# Patient Record
Sex: FEMALE | Race: WHITE | NOT HISPANIC OR LATINO | Employment: OTHER | URBAN - METROPOLITAN AREA
[De-identification: names, ages, dates, MRNs, and addresses within clinical notes are randomized per-mention and may not be internally consistent; named-entity substitution may affect disease eponyms.]

---

## 2017-05-31 ENCOUNTER — IMPORTED ENCOUNTER (OUTPATIENT)
Dept: URBAN - METROPOLITAN AREA CLINIC 31 | Facility: CLINIC | Age: 67
End: 2017-05-31

## 2017-05-31 PROBLEM — Z96.1: Noted: 2017-05-31

## 2017-05-31 PROBLEM — H40.003: Noted: 2017-05-31

## 2017-05-31 PROCEDURE — 99214 OFFICE O/P EST MOD 30 MIN: CPT

## 2017-11-17 ENCOUNTER — IMPORTED ENCOUNTER (OUTPATIENT)
Dept: URBAN - METROPOLITAN AREA CLINIC 31 | Facility: CLINIC | Age: 67
End: 2017-11-17

## 2017-11-17 PROBLEM — H57.051: Noted: 2017-11-17

## 2017-11-17 PROBLEM — Z96.1: Noted: 2017-11-17

## 2017-11-17 PROBLEM — H40.003: Noted: 2017-11-17

## 2017-11-17 PROCEDURE — 92083 EXTENDED VISUAL FIELD XM: CPT

## 2017-11-17 PROCEDURE — 99213 OFFICE O/P EST LOW 20 MIN: CPT

## 2018-05-04 ENCOUNTER — IMPORTED ENCOUNTER (OUTPATIENT)
Dept: URBAN - METROPOLITAN AREA CLINIC 31 | Facility: CLINIC | Age: 68
End: 2018-05-04

## 2018-05-04 PROBLEM — Z96.1: Noted: 2018-05-04

## 2018-05-04 PROBLEM — H40.013: Noted: 2018-05-04

## 2018-05-04 PROCEDURE — 92014 COMPRE OPH EXAM EST PT 1/>: CPT

## 2018-05-04 PROCEDURE — 92133 CPTRZD OPH DX IMG PST SGM ON: CPT

## 2018-05-04 PROCEDURE — 92015 DETERMINE REFRACTIVE STATE: CPT

## 2018-10-24 ENCOUNTER — IMPORTED ENCOUNTER (OUTPATIENT)
Dept: URBAN - METROPOLITAN AREA CLINIC 31 | Facility: CLINIC | Age: 68
End: 2018-10-24

## 2018-10-24 PROBLEM — H57.051: Noted: 2018-10-24

## 2018-10-24 PROBLEM — H40.013: Noted: 2018-10-24

## 2018-10-24 PROBLEM — Z96.1: Noted: 2018-10-24

## 2018-10-24 PROCEDURE — 99214 OFFICE O/P EST MOD 30 MIN: CPT

## 2018-10-24 PROCEDURE — 92083 EXTENDED VISUAL FIELD XM: CPT

## 2019-05-01 ENCOUNTER — IMPORTED ENCOUNTER (OUTPATIENT)
Dept: URBAN - METROPOLITAN AREA CLINIC 31 | Facility: CLINIC | Age: 69
End: 2019-05-01

## 2019-05-01 PROBLEM — Z96.1: Noted: 2019-05-01

## 2019-05-01 PROBLEM — H57.051: Noted: 2019-05-01

## 2019-05-01 PROBLEM — H40.013: Noted: 2019-05-01

## 2019-05-01 PROCEDURE — 92014 COMPRE OPH EXAM EST PT 1/>: CPT

## 2019-05-01 PROCEDURE — 92133 CPTRZD OPH DX IMG PST SGM ON: CPT

## 2019-11-08 ENCOUNTER — IMPORTED ENCOUNTER (OUTPATIENT)
Dept: URBAN - METROPOLITAN AREA CLINIC 31 | Facility: CLINIC | Age: 69
End: 2019-11-08

## 2019-11-08 PROBLEM — H40.013: Noted: 2019-11-08

## 2019-11-08 PROBLEM — Z96.1: Noted: 2019-11-08

## 2019-11-08 PROBLEM — H57.051: Noted: 2019-11-08

## 2019-11-08 PROCEDURE — 99213 OFFICE O/P EST LOW 20 MIN: CPT

## 2019-11-08 PROCEDURE — 92083 EXTENDED VISUAL FIELD XM: CPT

## 2020-05-01 ENCOUNTER — IMPORTED ENCOUNTER (OUTPATIENT)
Dept: URBAN - METROPOLITAN AREA CLINIC 31 | Facility: CLINIC | Age: 70
End: 2020-05-01

## 2020-05-01 PROBLEM — H40.013: Noted: 2020-05-01

## 2020-05-01 PROBLEM — H57.051: Noted: 2020-05-01

## 2020-05-01 PROBLEM — H57.053: Noted: 2020-05-01

## 2020-05-01 PROBLEM — Z96.1: Noted: 2020-05-01

## 2020-05-01 PROCEDURE — 99213 OFFICE O/P EST LOW 20 MIN: CPT

## 2020-05-01 PROCEDURE — 92133 CPTRZD OPH DX IMG PST SGM ON: CPT

## 2020-11-25 ENCOUNTER — TELEPHONE ENCOUNTER (OUTPATIENT)
Dept: URBAN - METROPOLITAN AREA CLINIC 9 | Facility: CLINIC | Age: 70
End: 2020-11-25

## 2020-11-30 ENCOUNTER — TELEPHONE ENCOUNTER (OUTPATIENT)
Dept: URBAN - METROPOLITAN AREA CLINIC 9 | Facility: CLINIC | Age: 70
End: 2020-11-30

## 2021-05-03 ENCOUNTER — IMPORTED ENCOUNTER (OUTPATIENT)
Dept: URBAN - METROPOLITAN AREA CLINIC 31 | Facility: CLINIC | Age: 71
End: 2021-05-03

## 2021-05-03 PROBLEM — H57.053: Noted: 2021-05-03

## 2021-05-03 PROBLEM — Z96.1: Noted: 2021-05-03

## 2021-05-03 PROBLEM — H40.013: Noted: 2021-05-03

## 2021-05-03 PROCEDURE — 92014 COMPRE OPH EXAM EST PT 1/>: CPT

## 2021-05-03 PROCEDURE — 92083 EXTENDED VISUAL FIELD XM: CPT

## 2021-11-10 ENCOUNTER — IMPORTED ENCOUNTER (OUTPATIENT)
Dept: URBAN - METROPOLITAN AREA CLINIC 31 | Facility: CLINIC | Age: 71
End: 2021-11-10

## 2021-11-10 PROBLEM — H26.492: Noted: 2021-11-10

## 2021-11-10 PROBLEM — D31.32: Noted: 2021-11-10

## 2021-11-10 PROBLEM — Z96.1: Noted: 2021-11-10

## 2021-11-10 PROBLEM — H40.013: Noted: 2021-11-10

## 2021-11-10 PROCEDURE — 99214 OFFICE O/P EST MOD 30 MIN: CPT

## 2021-11-12 ENCOUNTER — NEW REFERRAL (OUTPATIENT)
Dept: URBAN - METROPOLITAN AREA CLINIC 26 | Facility: CLINIC | Age: 71
End: 2021-11-12

## 2021-11-12 VITALS
BODY MASS INDEX: 21.07 KG/M2 | DIASTOLIC BLOOD PRESSURE: 78 MMHG | WEIGHT: 128 LBS | HEIGHT: 65.5 IN | SYSTOLIC BLOOD PRESSURE: 115 MMHG | HEART RATE: 72 BPM

## 2021-11-12 DIAGNOSIS — H04.123: ICD-10-CM

## 2021-11-12 DIAGNOSIS — D31.32: ICD-10-CM

## 2021-11-12 DIAGNOSIS — H35.363: ICD-10-CM

## 2021-11-12 DIAGNOSIS — H43.813: ICD-10-CM

## 2021-11-12 PROCEDURE — 92004 COMPRE OPH EXAM NEW PT 1/>: CPT

## 2021-11-12 PROCEDURE — 92250 FUNDUS PHOTOGRAPHY W/I&R: CPT

## 2021-11-12 ASSESSMENT — TONOMETRY
OD_IOP_MMHG: 15
OS_IOP_MMHG: 17

## 2021-11-12 ASSESSMENT — VISUAL ACUITY
OD_SC: 20/20-1
OS_SC: 20/20

## 2021-12-10 ENCOUNTER — IMPORTED ENCOUNTER (OUTPATIENT)
Dept: URBAN - METROPOLITAN AREA CLINIC 31 | Facility: CLINIC | Age: 71
End: 2021-12-10

## 2021-12-10 PROBLEM — H40.013: Noted: 2021-12-10

## 2021-12-10 PROBLEM — D31.32: Noted: 2021-12-10

## 2021-12-10 PROBLEM — H26.492: Noted: 2021-12-10

## 2021-12-10 PROBLEM — Z96.1: Noted: 2021-12-10

## 2021-12-10 PROCEDURE — 99213 OFFICE O/P EST LOW 20 MIN: CPT

## 2022-02-11 ENCOUNTER — FOLLOW UP (OUTPATIENT)
Dept: URBAN - METROPOLITAN AREA CLINIC 26 | Facility: CLINIC | Age: 72
End: 2022-02-11

## 2022-02-11 VITALS — WEIGHT: 128 LBS | HEIGHT: 65 IN | BODY MASS INDEX: 21.33 KG/M2

## 2022-02-11 DIAGNOSIS — H35.363: ICD-10-CM

## 2022-02-11 DIAGNOSIS — H04.123: ICD-10-CM

## 2022-02-11 DIAGNOSIS — D31.32: ICD-10-CM

## 2022-02-11 DIAGNOSIS — H43.813: ICD-10-CM

## 2022-02-11 DIAGNOSIS — H40.013: ICD-10-CM

## 2022-02-11 PROCEDURE — 92014 COMPRE OPH EXAM EST PT 1/>: CPT

## 2022-02-11 PROCEDURE — 92250 FUNDUS PHOTOGRAPHY W/I&R: CPT

## 2022-02-11 PROCEDURE — 76512 OPH US DX B-SCAN: CPT

## 2022-02-11 ASSESSMENT — TONOMETRY
OD_IOP_MMHG: 13
OS_IOP_MMHG: 14

## 2022-02-11 ASSESSMENT — VISUAL ACUITY
OS_SC: 20/20
OD_SC: 20/30-2

## 2022-03-16 ENCOUNTER — OFFICE VISIT (OUTPATIENT)
Age: 72
End: 2022-03-16

## 2022-03-18 ENCOUNTER — TELEPHONE ENCOUNTER (OUTPATIENT)
Dept: URBAN - METROPOLITAN AREA CLINIC 9 | Facility: CLINIC | Age: 72
End: 2022-03-18

## 2022-04-02 ASSESSMENT — VISUAL ACUITY
OS_SC: J1+16''
OS_SC: 20/20-1
OS_SC: J1
OS_SC: 20/20
OS_CC: 20/400
OS_SC: J1+13''
OD_CC: 20/20
OS_SC: J1+14''
OD_SC: 20/20
OD_CC: 20/20
OD_CC: 20/20-2
OS_CC: 20/200
OS_SC: J1+16''
OD_SC: J16
OD_CC: 20/20-1
OD_CC: 20/20-1
OU_SC: J1+16''
OD_CC: 20/25
OD_CC: 20/20
OU_CC: 20/2016''
OS_SC: J1+16''
OD_CC: 20/20
OS_SC: J1+14''
OS_SC: J1+
OD_CC: 20/20-1
OD_CC: 20/25
OS_SC: J1+

## 2022-04-02 ASSESSMENT — TONOMETRY
OS_IOP_MMHG: 16
OS_IOP_MMHG: 17
OD_IOP_MMHG: 16
OD_IOP_MMHG: 15
OD_IOP_MMHG: 16
OS_IOP_MMHG: 13
OD_IOP_MMHG: 14
OS_IOP_MMHG: 18
OD_IOP_MMHG: 20
OS_IOP_MMHG: 14
OS_IOP_MMHG: 15
OS_IOP_MMHG: 16
OS_IOP_MMHG: 16
OD_IOP_MMHG: 14
OD_IOP_MMHG: 14
OS_IOP_MMHG: 17
OS_IOP_MMHG: 16
OD_IOP_MMHG: 17

## 2022-04-08 ENCOUNTER — FOLLOW UP (OUTPATIENT)
Dept: URBAN - METROPOLITAN AREA CLINIC 29 | Facility: CLINIC | Age: 72
End: 2022-04-08

## 2022-04-08 DIAGNOSIS — H02.831: ICD-10-CM

## 2022-04-08 DIAGNOSIS — H35.363: ICD-10-CM

## 2022-04-08 DIAGNOSIS — H43.813: ICD-10-CM

## 2022-04-08 DIAGNOSIS — H40.013: ICD-10-CM

## 2022-04-08 DIAGNOSIS — H04.123: ICD-10-CM

## 2022-04-08 DIAGNOSIS — Z96.1: ICD-10-CM

## 2022-04-08 DIAGNOSIS — D31.32: ICD-10-CM

## 2022-04-08 PROCEDURE — 92014 COMPRE OPH EXAM EST PT 1/>: CPT

## 2022-04-08 ASSESSMENT — VISUAL ACUITY
OS_SC: 20/200
OD_SC: 20/20

## 2022-04-08 ASSESSMENT — TONOMETRY
OS_IOP_MMHG: 19
OD_IOP_MMHG: 17

## 2022-05-04 ENCOUNTER — TELEPHONE ENCOUNTER (OUTPATIENT)
Dept: URBAN - METROPOLITAN AREA CLINIC 9 | Facility: CLINIC | Age: 72
End: 2022-05-04

## 2022-05-04 ENCOUNTER — OFFICE VISIT (OUTPATIENT)
Dept: URBAN - METROPOLITAN AREA SURGERY CENTER 5 | Facility: SURGERY CENTER | Age: 72
End: 2022-05-04

## 2022-07-30 ENCOUNTER — TELEPHONE ENCOUNTER (OUTPATIENT)
Age: 72
End: 2022-07-30

## 2022-07-30 RX ORDER — PANTOPRAZOLE 20 MG/1
1 (ONE) TABLET DR TABLET, DELAYED RELEASE ORAL DAILY
Qty: 0 | Refills: 3 | OUTPATIENT
Start: 2016-08-24 | End: 2017-01-25

## 2022-07-30 RX ORDER — AMOXICILLIN 500 MG/1
2 (TWO) CAPSULE ORAL
Qty: 0 | Refills: 2 | OUTPATIENT
Start: 2015-12-31 | End: 2016-01-10

## 2022-07-30 RX ORDER — LACTOSE-REDUCED FOOD
236 LIQUID (ML) ORAL
Qty: 0 | Refills: 5 | OUTPATIENT
Start: 2019-11-05 | End: 2022-03-16

## 2022-07-30 RX ORDER — FAMOTIDINE 40 MG/1
1 (ONE) TABLET, FILM COATED ORAL DAILY
Qty: 0 | Refills: 2 | OUTPATIENT
Start: 2019-09-23 | End: 2022-03-16

## 2022-07-31 ENCOUNTER — TELEPHONE ENCOUNTER (OUTPATIENT)
Age: 72
End: 2022-07-31

## 2022-07-31 RX ORDER — LACTOSE-REDUCED FOOD
236 LIQUID (ML) ORAL
Qty: 0 | Refills: 5 | Status: ACTIVE | COMMUNITY
Start: 2019-11-05

## 2022-07-31 RX ORDER — PANTOPRAZOLE 20 MG/1
1 (ONE) TABLET, DELAYED RELEASE ORAL DAILY
Qty: 0 | Refills: 3 | Status: ACTIVE | COMMUNITY
Start: 2016-08-24

## 2022-07-31 RX ORDER — FAMOTIDINE 40 MG/1
1 (ONE) TABLET, FILM COATED ORAL DAILY
Qty: 0 | Refills: 3 | Status: ACTIVE | COMMUNITY
Start: 2018-04-03

## 2022-07-31 RX ORDER — PANTOPRAZOLE 20 MG/1
1 (ONE) TABLET, DELAYED RELEASE ORAL DAILY
Qty: 0 | Refills: 2 | Status: ACTIVE | COMMUNITY
Start: 2016-08-23

## 2022-07-31 RX ORDER — FAMOTIDINE 40 MG/1
1 (ONE) TABLET, FILM COATED ORAL DAILY
Qty: 0 | Refills: 12 | Status: ACTIVE | COMMUNITY
Start: 2018-05-11

## 2022-07-31 RX ORDER — FAMOTIDINE 40 MG/1
1 (ONE) TABLET, FILM COATED ORAL DAILY
Qty: 0 | Refills: 2 | Status: ACTIVE | COMMUNITY
Start: 2019-06-17

## 2022-07-31 RX ORDER — PANTOPRAZOLE 20 MG/1
1 (ONE) TABLET, DELAYED RELEASE ORAL DAILY
Qty: 0 | Refills: 16 | Status: ACTIVE | COMMUNITY
Start: 2015-12-08

## 2022-07-31 RX ORDER — AMOXICILLIN 500 MG/1
2 (TWO) CAPSULE ORAL
Qty: 0 | Refills: 2 | Status: ACTIVE | COMMUNITY
Start: 2015-12-31

## 2022-07-31 RX ORDER — FAMOTIDINE 40 MG/1
1 (ONE) TABLET, FILM COATED ORAL DAILY
Qty: 0 | Refills: 3 | Status: ACTIVE | COMMUNITY
Start: 2018-05-01

## 2022-07-31 RX ORDER — FAMOTIDINE 40 MG/1
1 (ONE) TABLET, FILM COATED ORAL DAILY
Qty: 0 | Refills: 2 | Status: ACTIVE | COMMUNITY
Start: 2018-05-14

## 2022-07-31 RX ORDER — FAMOTIDINE 40 MG/1
1 (ONE) TABLET, FILM COATED ORAL DAILY
Qty: 0 | Refills: 2 | Status: ACTIVE | COMMUNITY
Start: 2019-09-23

## 2022-07-31 RX ORDER — FAMOTIDINE 40 MG/1
1 (ONE) TABLET, FILM COATED ORAL DAILY
Qty: 0 | Refills: 5 | Status: ACTIVE | COMMUNITY
Start: 2017-12-29

## 2022-07-31 RX ORDER — FAMOTIDINE 40 MG/1
1 (ONE) TABLET, FILM COATED ORAL DAILY
Qty: 0 | Refills: 3 | Status: ACTIVE | COMMUNITY
Start: 2018-04-04

## 2022-07-31 RX ORDER — FAMOTIDINE 40 MG/1
1 (ONE) TABLET, FILM COATED ORAL DAILY
Qty: 0 | Refills: 16 | Status: ACTIVE | COMMUNITY
Start: 2017-01-25

## 2022-07-31 RX ORDER — PANTOPRAZOLE 20 MG/1
1 (ONE) TABLET, DELAYED RELEASE ORAL DAILY
Qty: 0 | Refills: 2 | Status: ACTIVE | COMMUNITY
Start: 2016-08-22

## 2022-10-27 ENCOUNTER — FOLLOW UP (OUTPATIENT)
Dept: URBAN - METROPOLITAN AREA CLINIC 26 | Facility: CLINIC | Age: 72
End: 2022-10-27

## 2022-10-27 DIAGNOSIS — H40.013: ICD-10-CM

## 2022-10-27 DIAGNOSIS — D31.32: ICD-10-CM

## 2022-10-27 DIAGNOSIS — H35.363: ICD-10-CM

## 2022-10-27 DIAGNOSIS — H04.123: ICD-10-CM

## 2022-10-27 DIAGNOSIS — H43.813: ICD-10-CM

## 2022-10-27 PROCEDURE — 92014 COMPRE OPH EXAM EST PT 1/>: CPT

## 2022-10-27 PROCEDURE — 92250 FUNDUS PHOTOGRAPHY W/I&R: CPT

## 2022-10-27 PROCEDURE — 76512 OPH US DX B-SCAN: CPT

## 2022-10-27 ASSESSMENT — VISUAL ACUITY
OS_SC: 20/20
OD_SC: 20/25-2

## 2022-10-27 ASSESSMENT — TONOMETRY
OD_IOP_MMHG: 21
OD_IOP_MMHG: 14
OS_IOP_MMHG: 18

## 2022-11-02 ENCOUNTER — PREPPED CHART (OUTPATIENT)
Dept: URBAN - METROPOLITAN AREA CLINIC 29 | Facility: CLINIC | Age: 72
End: 2022-11-02

## 2022-11-04 ENCOUNTER — ESTABLISHED PATIENT (OUTPATIENT)
Dept: URBAN - METROPOLITAN AREA CLINIC 29 | Facility: CLINIC | Age: 72
End: 2022-11-04

## 2022-11-04 DIAGNOSIS — H43.813: ICD-10-CM

## 2022-11-04 DIAGNOSIS — D31.32: ICD-10-CM

## 2022-11-04 DIAGNOSIS — H40.013: ICD-10-CM

## 2022-11-04 DIAGNOSIS — H04.123: ICD-10-CM

## 2022-11-04 PROCEDURE — 92012 INTRM OPH EXAM EST PATIENT: CPT

## 2022-11-04 ASSESSMENT — TONOMETRY
OS_IOP_MMHG: 22
OS_IOP_MMHG: 25
OD_IOP_MMHG: 24

## 2022-11-04 ASSESSMENT — VISUAL ACUITY
OS_PH: 20/50
OS_SC: 20/20
OD_SC: 20/20
OS_SC: 20/400
OD_SC: 20/70

## 2023-01-06 ENCOUNTER — ESTABLISHED PATIENT (OUTPATIENT)
Dept: URBAN - METROPOLITAN AREA CLINIC 29 | Facility: CLINIC | Age: 73
End: 2023-01-06

## 2023-01-06 DIAGNOSIS — H43.813: ICD-10-CM

## 2023-01-06 DIAGNOSIS — Z96.1: ICD-10-CM

## 2023-01-06 DIAGNOSIS — H35.363: ICD-10-CM

## 2023-01-06 DIAGNOSIS — H40.1131: ICD-10-CM

## 2023-01-06 DIAGNOSIS — D31.32: ICD-10-CM

## 2023-01-06 PROCEDURE — 92133 CPTRZD OPH DX IMG PST SGM ON: CPT

## 2023-01-06 PROCEDURE — 92012 INTRM OPH EXAM EST PATIENT: CPT

## 2023-01-06 ASSESSMENT — VISUAL ACUITY
OD_SC: 20/20
OS_SC: 20/20

## 2023-01-06 ASSESSMENT — TONOMETRY
OS_IOP_MMHG: 21
OD_IOP_MMHG: 22

## 2023-02-07 ENCOUNTER — TELEPHONE ENCOUNTER (OUTPATIENT)
Dept: URBAN - METROPOLITAN AREA CLINIC 7 | Facility: CLINIC | Age: 73
End: 2023-02-07

## 2023-02-07 VITALS — WEIGHT: 128 LBS | BODY MASS INDEX: 20.97 KG/M2

## 2023-02-07 RX ORDER — FAMOTIDINE 40 MG/1
1 (ONE) TABLET, FILM COATED ORAL DAILY
Qty: 0 | Refills: 12 | Status: ACTIVE | COMMUNITY
Start: 2018-05-11

## 2023-02-07 RX ORDER — AMOXICILLIN 500 MG/1
2 (TWO) CAPSULE ORAL
Qty: 0 | Refills: 2 | Status: DISCONTINUED | COMMUNITY
Start: 2015-12-31

## 2023-02-07 RX ORDER — LACTOSE-REDUCED FOOD
236 LIQUID (ML) ORAL
Qty: 0 | Refills: 5 | Status: DISCONTINUED | COMMUNITY
Start: 2019-11-05

## 2023-02-07 RX ORDER — PANTOPRAZOLE 20 MG/1
1 (ONE) TABLET, DELAYED RELEASE ORAL DAILY
Qty: 0 | Refills: 2 | Status: DISCONTINUED | COMMUNITY
Start: 2016-08-23

## 2023-02-10 ENCOUNTER — LAB OUTSIDE AN ENCOUNTER (OUTPATIENT)
Dept: URBAN - METROPOLITAN AREA CLINIC 7 | Facility: CLINIC | Age: 73
End: 2023-02-10

## 2023-02-10 ENCOUNTER — TELEPHONE ENCOUNTER (OUTPATIENT)
Dept: URBAN - METROPOLITAN AREA SURGERY CENTER 5 | Facility: SURGERY CENTER | Age: 73
End: 2023-02-10

## 2023-02-10 VITALS — BODY MASS INDEX: 20.97 KG/M2 | WEIGHT: 128 LBS

## 2023-02-10 PROBLEM — 302914006: Status: ACTIVE | Noted: 2023-02-10

## 2023-02-10 RX ORDER — FAMOTIDINE 40 MG/1
1 (ONE) TABLET, FILM COATED ORAL DAILY
Qty: 0 | Refills: 12 | Status: ACTIVE | COMMUNITY
Start: 2018-05-11

## 2023-02-17 ENCOUNTER — OFFICE VISIT (OUTPATIENT)
Dept: URBAN - METROPOLITAN AREA SURGERY CENTER 5 | Facility: SURGERY CENTER | Age: 73
End: 2023-02-17

## 2023-03-13 ENCOUNTER — TELEPHONE ENCOUNTER (OUTPATIENT)
Dept: URBAN - METROPOLITAN AREA CLINIC 7 | Facility: CLINIC | Age: 73
End: 2023-03-13

## 2023-03-14 ENCOUNTER — WEB ENCOUNTER (OUTPATIENT)
Dept: URBAN - METROPOLITAN AREA SURGERY CENTER 5 | Facility: SURGERY CENTER | Age: 73
End: 2023-03-14

## 2023-03-17 ENCOUNTER — CLAIMS CREATED FROM THE CLAIM WINDOW (OUTPATIENT)
Dept: URBAN - METROPOLITAN AREA CLINIC 4 | Facility: CLINIC | Age: 73
End: 2023-03-17
Payer: MEDICARE

## 2023-03-17 ENCOUNTER — CLAIMS CREATED FROM THE CLAIM WINDOW (OUTPATIENT)
Dept: URBAN - METROPOLITAN AREA SURGERY CENTER 5 | Facility: SURGERY CENTER | Age: 73
End: 2023-03-17
Payer: MEDICARE

## 2023-03-17 DIAGNOSIS — K22.89 DILATATION OF ESOPHAGUS: ICD-10-CM

## 2023-03-17 DIAGNOSIS — K31.89 ACQUIRED DEFORMITY OF DUODENUM: ICD-10-CM

## 2023-03-17 DIAGNOSIS — K21.9 GASTRO-ESOPHAGEAL REFLUX DISEASE WITHOUT ESOPHAGITIS: ICD-10-CM

## 2023-03-17 DIAGNOSIS — K31.89 OTHER DISEASES OF STOMACH AND DUODENUM: ICD-10-CM

## 2023-03-17 PROCEDURE — 43239 EGD BIOPSY SINGLE/MULTIPLE: CPT | Performed by: CLINIC/CENTER

## 2023-03-17 PROCEDURE — 88313 SPECIAL STAINS GROUP 2: CPT | Performed by: PATHOLOGY

## 2023-03-17 PROCEDURE — 88305 TISSUE EXAM BY PATHOLOGIST: CPT | Performed by: PATHOLOGY

## 2023-03-17 PROCEDURE — 43239 EGD BIOPSY SINGLE/MULTIPLE: CPT | Performed by: STUDENT IN AN ORGANIZED HEALTH CARE EDUCATION/TRAINING PROGRAM

## 2023-03-17 PROCEDURE — 88312 SPECIAL STAINS GROUP 1: CPT | Performed by: PATHOLOGY

## 2023-03-17 RX ORDER — FAMOTIDINE 40 MG/1
1 (ONE) TABLET, FILM COATED ORAL DAILY
Qty: 0 | Refills: 12 | Status: ACTIVE | COMMUNITY
Start: 2018-05-11

## 2023-04-10 ENCOUNTER — TELEPHONE ENCOUNTER (OUTPATIENT)
Dept: URBAN - METROPOLITAN AREA CLINIC 7 | Facility: CLINIC | Age: 73
End: 2023-04-10

## 2023-04-11 ENCOUNTER — TELEPHONE ENCOUNTER (OUTPATIENT)
Dept: URBAN - METROPOLITAN AREA CLINIC 7 | Facility: CLINIC | Age: 73
End: 2023-04-11

## 2023-04-18 ENCOUNTER — WEB ENCOUNTER (OUTPATIENT)
Dept: URBAN - METROPOLITAN AREA CLINIC 7 | Facility: CLINIC | Age: 73
End: 2023-04-18

## 2023-04-19 ENCOUNTER — DASHBOARD ENCOUNTERS (OUTPATIENT)
Age: 73
End: 2023-04-19

## 2023-04-21 ENCOUNTER — WEB ENCOUNTER (OUTPATIENT)
Dept: URBAN - METROPOLITAN AREA CLINIC 7 | Facility: CLINIC | Age: 73
End: 2023-04-21

## 2023-04-21 ENCOUNTER — OFFICE VISIT (OUTPATIENT)
Dept: URBAN - METROPOLITAN AREA CLINIC 7 | Facility: CLINIC | Age: 73
End: 2023-04-21
Payer: MEDICARE

## 2023-04-21 VITALS
BODY MASS INDEX: 21.16 KG/M2 | SYSTOLIC BLOOD PRESSURE: 98 MMHG | TEMPERATURE: 97.7 F | DIASTOLIC BLOOD PRESSURE: 60 MMHG | RESPIRATION RATE: 16 BRPM | HEIGHT: 65 IN | WEIGHT: 127 LBS

## 2023-04-21 DIAGNOSIS — K22.70 BARRETT'S ESOPHAGUS WITHOUT DYSPLASIA: ICD-10-CM

## 2023-04-21 DIAGNOSIS — K20.90 ESOPHAGITIS: ICD-10-CM

## 2023-04-21 DIAGNOSIS — D12.6 ADENOMATOUS COLON POLYP: ICD-10-CM

## 2023-04-21 PROBLEM — 16761005: Status: ACTIVE | Noted: 2023-04-21

## 2023-04-21 PROCEDURE — 99214 OFFICE O/P EST MOD 30 MIN: CPT | Performed by: STUDENT IN AN ORGANIZED HEALTH CARE EDUCATION/TRAINING PROGRAM

## 2023-04-21 RX ORDER — FAMOTIDINE 40 MG/1
1 (ONE) TABLET, FILM COATED ORAL DAILY
Qty: 0 | Refills: 12 | Status: ACTIVE | COMMUNITY
Start: 2018-05-11

## 2023-04-21 RX ORDER — ATENOLOL 25 MG/1
1 TABLET TABLET ORAL ONCE A DAY
Status: ACTIVE | COMMUNITY

## 2023-04-21 NOTE — HPI-TODAY'S VISIT:
71 YO female presents for follow up after EGD for Hoover's esophagus.   Recent EGd without evidence of hoover's.  Repeat EGD in 2 years.  Repeat colonsocopy in 5 years from last colonoscopy.  PAtient shas enough medication on Fmaotidine. being prescribed by PCP.    ALARM SYMPTOMS --No odynophagia --No hematemesis --No melena / hematochezia --No unintentional weight loss --No iron deficiency anemia  PERTINENT GI FAMILY HISTORY Colon polyps:  no family history Colon cancer:  no family history   GASTROINTESTINAL PROCEDURE HISTORY Colonoscopy:	 --2022 EGD:   --2023  PERTINENT MEDICAL HISTORY Aspirin 81mg PO daily:   --Not Taking Other Blood Thinners:   Cardiac Disease:   --No History  Pulmonary Disease --No History  Abdominal Surgery & Hernia:  No History

## 2023-04-24 ENCOUNTER — FOLLOW UP (OUTPATIENT)
Dept: URBAN - METROPOLITAN AREA CLINIC 29 | Facility: CLINIC | Age: 73
End: 2023-04-24

## 2023-04-24 DIAGNOSIS — H35.363: ICD-10-CM

## 2023-04-24 DIAGNOSIS — H43.813: ICD-10-CM

## 2023-04-24 DIAGNOSIS — Z96.1: ICD-10-CM

## 2023-04-24 DIAGNOSIS — H02.831: ICD-10-CM

## 2023-04-24 DIAGNOSIS — D31.32: ICD-10-CM

## 2023-04-24 DIAGNOSIS — H40.1131: ICD-10-CM

## 2023-04-24 DIAGNOSIS — H04.123: ICD-10-CM

## 2023-04-24 DIAGNOSIS — H02.834: ICD-10-CM

## 2023-04-24 PROCEDURE — 92012 INTRM OPH EXAM EST PATIENT: CPT

## 2023-04-24 ASSESSMENT — VISUAL ACUITY
OD_SC: 20/30+1
OS_PH: 20/50
OS_SC: 20/200

## 2023-04-24 ASSESSMENT — TONOMETRY
OD_IOP_MMHG: 15
OS_IOP_MMHG: 13

## 2023-05-11 ENCOUNTER — FOLLOW UP (OUTPATIENT)
Dept: URBAN - METROPOLITAN AREA CLINIC 26 | Facility: CLINIC | Age: 73
End: 2023-05-11

## 2023-05-11 VITALS — BODY MASS INDEX: 20.91 KG/M2 | HEIGHT: 65.5 IN | WEIGHT: 127 LBS

## 2023-05-11 DIAGNOSIS — D31.32: ICD-10-CM

## 2023-05-11 DIAGNOSIS — H43.813: ICD-10-CM

## 2023-05-11 DIAGNOSIS — H04.123: ICD-10-CM

## 2023-05-11 DIAGNOSIS — H40.013: ICD-10-CM

## 2023-05-11 DIAGNOSIS — H35.363: ICD-10-CM

## 2023-05-11 PROCEDURE — 76512 OPH US DX B-SCAN: CPT

## 2023-05-11 PROCEDURE — 92014 COMPRE OPH EXAM EST PT 1/>: CPT

## 2023-05-11 PROCEDURE — 92250 FUNDUS PHOTOGRAPHY W/I&R: CPT

## 2023-05-11 ASSESSMENT — VISUAL ACUITY
OD_SC: 20/30
OS_SC: 20/20-2

## 2023-05-11 ASSESSMENT — TONOMETRY
OS_IOP_MMHG: 11
OD_IOP_MMHG: 10

## 2023-11-30 ENCOUNTER — FOLLOW UP (OUTPATIENT)
Dept: URBAN - METROPOLITAN AREA CLINIC 26 | Facility: CLINIC | Age: 73
End: 2023-11-30

## 2023-11-30 DIAGNOSIS — D31.32: ICD-10-CM

## 2023-11-30 DIAGNOSIS — H43.813: ICD-10-CM

## 2023-11-30 DIAGNOSIS — H40.013: ICD-10-CM

## 2023-11-30 DIAGNOSIS — H35.363: ICD-10-CM

## 2023-11-30 DIAGNOSIS — H04.123: ICD-10-CM

## 2023-11-30 PROCEDURE — 92014 COMPRE OPH EXAM EST PT 1/>: CPT

## 2023-11-30 PROCEDURE — 76512 OPH US DX B-SCAN: CPT

## 2023-11-30 PROCEDURE — 92250 FUNDUS PHOTOGRAPHY W/I&R: CPT

## 2023-11-30 ASSESSMENT — TONOMETRY
OD_IOP_MMHG: 19
OS_IOP_MMHG: 20

## 2023-11-30 ASSESSMENT — VISUAL ACUITY
OS_SC: 20/20
OD_SC: 20/20-2

## 2023-12-22 ENCOUNTER — FOLLOW UP (OUTPATIENT)
Dept: URBAN - METROPOLITAN AREA CLINIC 29 | Facility: CLINIC | Age: 73
End: 2023-12-22

## 2023-12-22 DIAGNOSIS — H04.123: ICD-10-CM

## 2023-12-22 DIAGNOSIS — D31.32: ICD-10-CM

## 2023-12-22 DIAGNOSIS — Z96.1: ICD-10-CM

## 2023-12-22 DIAGNOSIS — H40.1191: ICD-10-CM

## 2023-12-22 DIAGNOSIS — H43.813: ICD-10-CM

## 2023-12-22 DIAGNOSIS — H35.363: ICD-10-CM

## 2023-12-22 PROCEDURE — 92083 EXTENDED VISUAL FIELD XM: CPT

## 2023-12-22 PROCEDURE — 99213 OFFICE O/P EST LOW 20 MIN: CPT

## 2023-12-22 ASSESSMENT — TONOMETRY
OS_IOP_MMHG: 19
OD_IOP_MMHG: 19

## 2023-12-22 ASSESSMENT — VISUAL ACUITY
OD_SC: 20/25+2
OS_SC: 20/20

## 2024-01-31 ENCOUNTER — FOLLOW UP (OUTPATIENT)
Dept: URBAN - METROPOLITAN AREA CLINIC 29 | Facility: CLINIC | Age: 74
End: 2024-01-31

## 2024-01-31 DIAGNOSIS — H35.363: ICD-10-CM

## 2024-01-31 DIAGNOSIS — Z96.1: ICD-10-CM

## 2024-01-31 DIAGNOSIS — D31.32: ICD-10-CM

## 2024-01-31 DIAGNOSIS — H43.813: ICD-10-CM

## 2024-01-31 DIAGNOSIS — H40.1131: ICD-10-CM

## 2024-01-31 DIAGNOSIS — H04.123: ICD-10-CM

## 2024-01-31 PROCEDURE — 99213 OFFICE O/P EST LOW 20 MIN: CPT

## 2024-01-31 ASSESSMENT — VISUAL ACUITY
OS_SC: 20/20
OD_SC: 20/20

## 2024-01-31 ASSESSMENT — TONOMETRY
OD_IOP_MMHG: 19
OS_IOP_MMHG: 19

## 2024-04-26 ENCOUNTER — FOLLOW UP (OUTPATIENT)
Dept: URBAN - METROPOLITAN AREA CLINIC 29 | Facility: CLINIC | Age: 74
End: 2024-04-26

## 2024-04-26 DIAGNOSIS — D31.32: ICD-10-CM

## 2024-04-26 DIAGNOSIS — H35.363: ICD-10-CM

## 2024-04-26 DIAGNOSIS — Z96.1: ICD-10-CM

## 2024-04-26 DIAGNOSIS — H40.1131: ICD-10-CM

## 2024-04-26 DIAGNOSIS — H04.123: ICD-10-CM

## 2024-04-26 DIAGNOSIS — H43.813: ICD-10-CM

## 2024-04-26 PROCEDURE — 92014 COMPRE OPH EXAM EST PT 1/>: CPT

## 2024-04-26 PROCEDURE — 92133 CPTRZD OPH DX IMG PST SGM ON: CPT

## 2024-04-26 ASSESSMENT — TONOMETRY
OS_IOP_MMHG: 19
OD_IOP_MMHG: 19

## 2024-04-26 ASSESSMENT — VISUAL ACUITY
OS_SC: J1+-1
OD_SC: 20/20-1

## 2024-11-18 ENCOUNTER — FOLLOW UP (OUTPATIENT)
Dept: URBAN - METROPOLITAN AREA CLINIC 29 | Facility: CLINIC | Age: 74
End: 2024-11-18

## 2024-11-18 DIAGNOSIS — H40.1131: ICD-10-CM

## 2024-11-18 PROCEDURE — 99213 OFFICE O/P EST LOW 20 MIN: CPT

## 2024-11-18 PROCEDURE — 92083 EXTENDED VISUAL FIELD XM: CPT

## 2024-12-05 ENCOUNTER — COMPREHENSIVE EXAM (OUTPATIENT)
Age: 74
End: 2024-12-05

## 2024-12-05 VITALS — WEIGHT: 130 LBS | HEIGHT: 65 IN | BODY MASS INDEX: 21.66 KG/M2

## 2024-12-05 DIAGNOSIS — H04.123: ICD-10-CM

## 2024-12-05 DIAGNOSIS — H43.813: ICD-10-CM

## 2024-12-05 DIAGNOSIS — H02.834: ICD-10-CM

## 2024-12-05 DIAGNOSIS — H35.363: ICD-10-CM

## 2024-12-05 DIAGNOSIS — Z96.1: ICD-10-CM

## 2024-12-05 DIAGNOSIS — H40.1131: ICD-10-CM

## 2024-12-05 DIAGNOSIS — D31.32: ICD-10-CM

## 2024-12-05 DIAGNOSIS — H02.831: ICD-10-CM

## 2024-12-05 PROCEDURE — 76512 OPH US DX B-SCAN: CPT

## 2024-12-05 PROCEDURE — 92014 COMPRE OPH EXAM EST PT 1/>: CPT

## 2024-12-05 PROCEDURE — 92250 FUNDUS PHOTOGRAPHY W/I&R: CPT | Mod: 59

## 2024-12-24 ENCOUNTER — FOLLOW UP (OUTPATIENT)
Age: 74
End: 2024-12-24

## 2024-12-24 DIAGNOSIS — H40.1131: ICD-10-CM

## 2024-12-24 PROCEDURE — 99213 OFFICE O/P EST LOW 20 MIN: CPT

## 2025-01-14 ENCOUNTER — FOLLOW UP (OUTPATIENT)
Age: 75
End: 2025-01-14

## 2025-01-14 DIAGNOSIS — H40.1131: ICD-10-CM

## 2025-01-14 PROCEDURE — 99213 OFFICE O/P EST LOW 20 MIN: CPT

## 2025-01-14 RX ORDER — TIMOLOL MALEATE 5 MG/ML: 1 SOLUTION OPHTHALMIC EVERY MORNING

## 2025-02-05 ENCOUNTER — FOLLOW UP (OUTPATIENT)
Age: 75
End: 2025-02-05

## 2025-02-05 DIAGNOSIS — H40.1131: ICD-10-CM

## 2025-02-05 PROCEDURE — 99213 OFFICE O/P EST LOW 20 MIN: CPT

## 2025-04-30 ENCOUNTER — FOLLOW UP (OUTPATIENT)
Age: 75
End: 2025-04-30

## 2025-04-30 DIAGNOSIS — H40.1131: ICD-10-CM

## 2025-04-30 PROCEDURE — 99213 OFFICE O/P EST LOW 20 MIN: CPT

## 2025-04-30 RX ORDER — BIMATOPROST 0.3 MG/ML: 1 SOLUTION/ DROPS OPHTHALMIC EVERY EVENING
